# Patient Record
Sex: MALE | Race: WHITE | NOT HISPANIC OR LATINO | URBAN - METROPOLITAN AREA
[De-identification: names, ages, dates, MRNs, and addresses within clinical notes are randomized per-mention and may not be internally consistent; named-entity substitution may affect disease eponyms.]

---

## 2017-07-11 ENCOUNTER — NEW PATIENT (OUTPATIENT)
Dept: URBAN - METROPOLITAN AREA CLINIC 10 | Facility: CLINIC | Age: 72
End: 2017-07-11
Payer: MEDICARE

## 2017-07-11 DIAGNOSIS — H02.421 MYOGENIC PTOSIS OF RIGHT EYELID: ICD-10-CM

## 2017-07-11 PROCEDURE — 92250 FUNDUS PHOTOGRAPHY W/I&R: CPT | Performed by: OPTOMETRIST

## 2017-07-11 PROCEDURE — 92004 COMPRE OPH EXAM NEW PT 1/>: CPT | Performed by: OPTOMETRIST

## 2017-07-11 ASSESSMENT — VISUAL ACUITY
OD: 20/25
OS: 20/25

## 2017-07-11 ASSESSMENT — INTRAOCULAR PRESSURE
OD: 13
OS: 12

## 2018-08-06 ENCOUNTER — CONSULT (OUTPATIENT)
Dept: URBAN - METROPOLITAN AREA CLINIC 10 | Facility: CLINIC | Age: 73
End: 2018-08-06
Payer: MEDICARE

## 2018-08-06 PROCEDURE — 92285 EXTERNAL OCULAR PHOTOGRAPHY: CPT | Performed by: OPHTHALMOLOGY

## 2018-08-06 PROCEDURE — 92081 LIMITED VISUAL FIELD XM: CPT | Performed by: OPHTHALMOLOGY

## 2018-08-06 PROCEDURE — 99214 OFFICE O/P EST MOD 30 MIN: CPT | Performed by: OPHTHALMOLOGY

## 2019-03-12 ENCOUNTER — FOLLOW UP ESTABLISHED (OUTPATIENT)
Dept: URBAN - METROPOLITAN AREA CLINIC 10 | Facility: CLINIC | Age: 74
End: 2019-03-12
Payer: MEDICARE

## 2019-03-12 DIAGNOSIS — H43.813 VITREOUS DEGENERATION, BILATERAL: ICD-10-CM

## 2019-03-12 DIAGNOSIS — H04.123 TEAR FILM INSUFFICIENCY OF BILATERAL LACRIMAL GLANDS: ICD-10-CM

## 2019-03-12 PROCEDURE — 92134 CPTRZ OPH DX IMG PST SGM RTA: CPT | Performed by: OPTOMETRIST

## 2019-03-12 PROCEDURE — 92014 COMPRE OPH EXAM EST PT 1/>: CPT | Performed by: OPTOMETRIST

## 2019-03-12 ASSESSMENT — VISUAL ACUITY
OS: 20/20
OD: 20/20

## 2019-03-12 ASSESSMENT — INTRAOCULAR PRESSURE
OD: 17
OS: 17

## 2019-03-12 ASSESSMENT — KERATOMETRY
OD: 43.13
OS: 43.13

## 2019-07-02 ENCOUNTER — FOLLOW UP ESTABLISHED (OUTPATIENT)
Dept: URBAN - METROPOLITAN AREA CLINIC 39 | Facility: CLINIC | Age: 74
End: 2019-07-02
Payer: MEDICARE

## 2019-07-02 PROCEDURE — 99214 OFFICE O/P EST MOD 30 MIN: CPT | Performed by: OPHTHALMOLOGY

## 2019-07-02 PROCEDURE — 92285 EXTERNAL OCULAR PHOTOGRAPHY: CPT | Performed by: OPHTHALMOLOGY

## 2019-07-02 PROCEDURE — 92081 LIMITED VISUAL FIELD XM: CPT | Performed by: OPHTHALMOLOGY

## 2019-07-02 RX ORDER — ERYTHROMYCIN 5 MG/G
OINTMENT OPHTHALMIC
Qty: 1 | Refills: 1 | Status: INACTIVE
Start: 2019-07-02 | End: 2019-12-02

## 2019-07-03 ENCOUNTER — Encounter (OUTPATIENT)
Dept: URBAN - METROPOLITAN AREA CLINIC 10 | Facility: CLINIC | Age: 74
End: 2019-07-03
Payer: MEDICARE

## 2019-07-03 DIAGNOSIS — H16.223 KERATOCONJUNCT SICCA, NOT SPECIFIED AS SJOGREN'S, BILATERAL: ICD-10-CM

## 2019-07-03 DIAGNOSIS — H02.423 MYOGENIC PTOSIS OF BILATERAL EYELIDS: ICD-10-CM

## 2019-07-03 PROCEDURE — 99213 OFFICE O/P EST LOW 20 MIN: CPT | Performed by: PHYSICIAN ASSISTANT

## 2019-07-18 ENCOUNTER — SURGERY (OUTPATIENT)
Dept: URBAN - METROPOLITAN AREA SURGERY 19 | Facility: SURGERY | Age: 74
End: 2019-07-18
Payer: MEDICARE

## 2019-08-01 ENCOUNTER — POST OP (OUTPATIENT)
Dept: URBAN - METROPOLITAN AREA CLINIC 10 | Facility: CLINIC | Age: 74
End: 2019-08-01

## 2019-08-01 DIAGNOSIS — Z48.817 ENCNTR FOR SURGICAL AFTCR FOL SURGERY ON THE SKIN, SUBCU: Primary | ICD-10-CM

## 2019-08-01 PROCEDURE — 99024 POSTOP FOLLOW-UP VISIT: CPT | Performed by: OPTOMETRIST

## 2019-08-01 ASSESSMENT — INTRAOCULAR PRESSURE
OS: 18
OD: 18

## 2019-11-04 ENCOUNTER — FOLLOW UP ESTABLISHED (OUTPATIENT)
Dept: URBAN - METROPOLITAN AREA CLINIC 39 | Facility: CLINIC | Age: 74
End: 2019-11-04
Payer: MEDICARE

## 2019-11-04 DIAGNOSIS — D17.0 BEN LIPOMATOUS NEOPLM OF SKIN, SUBCU OF HEAD, FACE AND NECK: Primary | ICD-10-CM

## 2019-11-04 PROCEDURE — 99214 OFFICE O/P EST MOD 30 MIN: CPT | Performed by: OPHTHALMOLOGY

## 2019-11-04 PROCEDURE — 92285 EXTERNAL OCULAR PHOTOGRAPHY: CPT | Performed by: OPHTHALMOLOGY

## 2019-11-08 ENCOUNTER — Encounter (OUTPATIENT)
Dept: URBAN - METROPOLITAN AREA CLINIC 10 | Facility: CLINIC | Age: 74
End: 2019-11-08
Payer: MEDICARE

## 2019-11-08 DIAGNOSIS — Z01.818 ENCOUNTER FOR OTHER PREPROCEDURAL EXAMINATION: Primary | ICD-10-CM

## 2019-11-08 PROCEDURE — 99213 OFFICE O/P EST LOW 20 MIN: CPT | Performed by: PHYSICIAN ASSISTANT

## 2019-11-13 ENCOUNTER — SURGERY (OUTPATIENT)
Dept: URBAN - METROPOLITAN AREA SURGERY 5 | Facility: SURGERY | Age: 74
End: 2019-11-13
Payer: MEDICARE

## 2019-11-13 PROCEDURE — 21555 EXC NECK LES SC < 3 CM: CPT | Performed by: OPHTHALMOLOGY

## 2019-12-02 ENCOUNTER — POST OP (OUTPATIENT)
Dept: URBAN - METROPOLITAN AREA CLINIC 10 | Facility: CLINIC | Age: 74
End: 2019-12-02

## 2019-12-02 PROCEDURE — 99024 POSTOP FOLLOW-UP VISIT: CPT | Performed by: OPTOMETRIST

## 2020-07-08 ENCOUNTER — FOLLOW UP ESTABLISHED (OUTPATIENT)
Dept: URBAN - METROPOLITAN AREA CLINIC 10 | Facility: CLINIC | Age: 75
End: 2020-07-08
Payer: MEDICARE

## 2020-07-08 DIAGNOSIS — Z79.84 LONG TERM (CURRENT) USE OF ORAL ANTIDIABETIC DRUGS: ICD-10-CM

## 2020-07-08 PROCEDURE — 92014 COMPRE OPH EXAM EST PT 1/>: CPT | Performed by: OPTOMETRIST

## 2020-07-08 PROCEDURE — 92134 CPTRZ OPH DX IMG PST SGM RTA: CPT | Performed by: OPTOMETRIST

## 2020-07-08 ASSESSMENT — VISUAL ACUITY
OS: 20/20
OD: 20/20

## 2020-07-08 ASSESSMENT — INTRAOCULAR PRESSURE
OD: 11
OS: 12

## 2021-09-07 ENCOUNTER — OFFICE VISIT (OUTPATIENT)
Dept: URBAN - METROPOLITAN AREA CLINIC 10 | Facility: CLINIC | Age: 76
End: 2021-09-07
Payer: MEDICARE

## 2021-09-07 DIAGNOSIS — H26.493 OTHER SECONDARY CATARACT, BILATERAL: ICD-10-CM

## 2021-09-07 DIAGNOSIS — E11.9 TYPE 2 DIABETES MELLITUS WITHOUT COMPLICATIONS: ICD-10-CM

## 2021-09-07 PROCEDURE — 92014 COMPRE OPH EXAM EST PT 1/>: CPT | Performed by: OPTOMETRIST

## 2021-09-07 PROCEDURE — 92134 CPTRZ OPH DX IMG PST SGM RTA: CPT | Performed by: OPTOMETRIST

## 2021-09-07 ASSESSMENT — INTRAOCULAR PRESSURE
OS: 12
OD: 13

## 2021-09-07 ASSESSMENT — VISUAL ACUITY
OS: 20/25
OD: 20/25

## 2021-09-07 NOTE — IMPRESSION/PLAN
Impression: Keratoconjunctivitis sicca, bilateral Plan: Recommend artificial tears at least 4 times a day and gel drop or tear ointment at bedtime.

## 2021-09-07 NOTE — IMPRESSION/PLAN
Impression: Vitreous degeneration, bilateral Plan: There is no evidence of retinal pathology. All signs and risks of retinal detachment or tears were discussed in detail. If pt. notices any symptoms discussed, contact office immediately. Recommend pt. return for normal recall.

## 2022-09-08 ENCOUNTER — OFFICE VISIT (OUTPATIENT)
Dept: URBAN - METROPOLITAN AREA CLINIC 10 | Facility: CLINIC | Age: 77
End: 2022-09-08
Payer: MEDICARE

## 2022-09-08 DIAGNOSIS — Z79.84 LONG TERM (CURRENT) USE OF ORAL ANTIDIABETIC DRUGS: ICD-10-CM

## 2022-09-08 DIAGNOSIS — H52.4 PRESBYOPIA: ICD-10-CM

## 2022-09-08 DIAGNOSIS — H16.222 KERATOCONJUNCTIVITIS SICCA OF LEFT EYE: ICD-10-CM

## 2022-09-08 DIAGNOSIS — H26.493 OTHER SECONDARY CATARACT, BILATERAL: ICD-10-CM

## 2022-09-08 DIAGNOSIS — H43.313 VITREOUS MEMBRANES AND STRANDS, BILATERAL: ICD-10-CM

## 2022-09-08 DIAGNOSIS — E11.9 TYPE 2 DIABETES MELLITUS WITHOUT COMPLICATIONS: Primary | ICD-10-CM

## 2022-09-08 PROCEDURE — 99214 OFFICE O/P EST MOD 30 MIN: CPT | Performed by: OPTOMETRIST

## 2022-09-08 PROCEDURE — 92134 CPTRZ OPH DX IMG PST SGM RTA: CPT | Performed by: OPTOMETRIST

## 2022-09-08 ASSESSMENT — INTRAOCULAR PRESSURE
OD: 13
OS: 13

## 2022-09-08 ASSESSMENT — VISUAL ACUITY
OD: 20/30
OS: 20/25

## 2022-09-08 NOTE — IMPRESSION/PLAN
Impression: Keratoconjunctivitis sicca of left eye: H16.222.  Plan: Recommend artificial tears QID or PRN

## 2022-09-08 NOTE — IMPRESSION/PLAN
Impression: Type 2 diabetes mellitus without complications: I98.8. Plan: No Non-Proliferative Diabetic Retinopathy, no Diabetic Macular Edema and no Neovascularization of the iris, disc, or elsewhere. Discussed ocular and systemic benefits of blood sugar control. Continue care with PCP. Notes sent to PCP. RTC 1 year for CEE.